# Patient Record
Sex: FEMALE | Race: WHITE | NOT HISPANIC OR LATINO | URBAN - METROPOLITAN AREA
[De-identification: names, ages, dates, MRNs, and addresses within clinical notes are randomized per-mention and may not be internally consistent; named-entity substitution may affect disease eponyms.]

---

## 2023-04-04 ENCOUNTER — EMERGENCY (EMERGENCY)
Facility: HOSPITAL | Age: 53
LOS: 1 days | Discharge: HOME | End: 2023-04-04
Attending: EMERGENCY MEDICINE | Admitting: EMERGENCY MEDICINE
Payer: MEDICAID

## 2023-04-04 VITALS
SYSTOLIC BLOOD PRESSURE: 137 MMHG | DIASTOLIC BLOOD PRESSURE: 96 MMHG | TEMPERATURE: 98 F | OXYGEN SATURATION: 97 % | RESPIRATION RATE: 18 BRPM | HEART RATE: 110 BPM

## 2023-04-04 VITALS
TEMPERATURE: 99 F | SYSTOLIC BLOOD PRESSURE: 161 MMHG | RESPIRATION RATE: 18 BRPM | OXYGEN SATURATION: 97 % | DIASTOLIC BLOOD PRESSURE: 73 MMHG | HEART RATE: 90 BPM

## 2023-04-04 PROCEDURE — 70486 CT MAXILLOFACIAL W/O DYE: CPT | Mod: MA

## 2023-04-04 PROCEDURE — 70450 CT HEAD/BRAIN W/O DYE: CPT | Mod: 26,MA

## 2023-04-04 PROCEDURE — 72125 CT NECK SPINE W/O DYE: CPT | Mod: 26,MA

## 2023-04-04 PROCEDURE — 99284 EMERGENCY DEPT VISIT MOD MDM: CPT | Mod: 25

## 2023-04-04 PROCEDURE — 70486 CT MAXILLOFACIAL W/O DYE: CPT | Mod: 26,MA

## 2023-04-04 PROCEDURE — 70450 CT HEAD/BRAIN W/O DYE: CPT | Mod: 26,MA,77

## 2023-04-04 PROCEDURE — 71101 X-RAY EXAM UNILAT RIBS/CHEST: CPT

## 2023-04-04 PROCEDURE — 99285 EMERGENCY DEPT VISIT HI MDM: CPT

## 2023-04-04 PROCEDURE — 72125 CT NECK SPINE W/O DYE: CPT | Mod: MA

## 2023-04-04 PROCEDURE — 70450 CT HEAD/BRAIN W/O DYE: CPT | Mod: MA

## 2023-04-04 PROCEDURE — 71101 X-RAY EXAM UNILAT RIBS/CHEST: CPT | Mod: 26,RT

## 2023-04-04 RX ORDER — LEVETIRACETAM 250 MG/1
500 TABLET, FILM COATED ORAL ONCE
Refills: 0 | Status: COMPLETED | OUTPATIENT
Start: 2023-04-04 | End: 2023-04-04

## 2023-04-04 RX ADMIN — LEVETIRACETAM 500 MILLIGRAM(S): 250 TABLET, FILM COATED ORAL at 10:21
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
## 2023-04-04 NOTE — ED PROVIDER NOTE - CARDIAC, MLM

## 2023-04-04 NOTE — ED ADULT NURSE NOTE - NSFALLRSKASSESASSIST_ED_ALL_ED
----- Message from Hanny Ventpatricia sent at 3/20/2017  1:55 PM CDT -----  Contact: pt's wife  Pt's wife called in reference to the procedure he is having on Friday am. She has some questions about it.  He is in the hospital here on the 7th floor room 730A.  She can be reached @ [phone] or [phone]. Thanks!!   no
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
## 2023-04-04 NOTE — ED PROVIDER NOTE - PATIENT PORTAL LINK FT
You can access the FollowMyHealth Patient Portal offered by [facility] by registering at the following website: [email or web address]. By joining [facility]’s FollowMyHealth portal, you will also be able to view your health information using other applications (apps) compatible with our system.

## 2023-04-04 NOTE — ED PROVIDER NOTE - NSFOLLOWUPCLINICS_GEN_ALL_ED_FT
[facility]  Family Medicine  [address]  [place], [place] [address]  Phone: [phone]  Fax:   Follow Up Time: 1-3 Days
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
## 2023-04-04 NOTE — ED PROVIDER NOTE - NSFOLLOWUPINSTRUCTIONS_ED_ALL_ED_FT
Head Injury, Adult    There are many types of head injuries. Head injuries can be as minor as a small bump, or they can be a serious medical issue. More severe head injuries include:  A jarring injury to the brain (concussion).  A bruise (contusion) of the brain. This means there is bleeding in the brain that can cause swelling.  A cracked skull (skull fracture).  Bleeding in the brain that collects, clots, and forms a bump (hematoma).  After a head injury, most problems occur within the first 24 hours, but side effects may occur up to 7–10 days after the injury. It is important to watch your condition for any changes. You may need to be observed in the emergency department or urgent care, or you may be admitted to the hospital.    What are the causes?  There are many possible causes of a head injury. Serious head injuries may be caused by car accidents, bicycle or motorcycle accidents, sports injuries, falls, or being struck by an object.    What are the symptoms?  Symptoms of a head injury include a contusion, bump, or bleeding at the site of the injury. Other physical symptoms may include:  Headache.  Nausea or vomiting.  Dizziness.  Blurred or double vision.  Being uncomfortable around bright lights or loud noises.  Seizures.  Feeling tired.  Trouble being awakened.  Loss of consciousness.  Mental or emotional symptoms may include:  Irritability.  Confusion and memory problems.  Poor attention and concentration.  Changes in eating or sleeping habits.  Anxiety or depression.  How is this diagnosed?  This condition can usually be diagnosed based on your symptoms, a description of the injury, and a physical exam. You may also have imaging tests done, such as a CT scan or an MRI.    How is this treated?  Treatment for this condition depends on the severity and type of injury you have. The main goal of treatment is to prevent complications and allow the brain time to heal.    Mild head injury    If you have a mild head injury, you may be sent home, and treatment may include:  Observation. A responsible adult should stay with you for 24 hours after your injury and check on you often.  Physical rest.  Brain rest.  Pain medicines.  Severe head injury    If you have a severe head injury, treatment may include:  Close observation. This includes hospitalization with the following care:  Frequent physical exams.  Frequent checks of how your brain and nervous system are working (neurological status).  Checking your blood pressure and oxygen levels.  Medicines to relieve pain, prevent seizures, and decrease brain swelling.  Airway protection and breathing support. This may include using a ventilator.  Treatments that monitor and manage swelling inside the brain.  Brain surgery. This may be needed to:  Remove a collection of blood or blood clots.  Stop the bleeding.  Remove a part of the skull to allow room for the brain to swell.  Follow these instructions at home:  Activity    Rest and avoid activities that are physically hard or tiring.  Make sure you get enough sleep.  Let your brain rest by limiting activities that require a lot of thought or attention, such as:  Watching TV.  Playing memory games and puzzles.  Job-related work or homework.  Working on the computer, using social media, and texting.  Avoid activities that could cause another head injury, such as playing sports, until your health care provider approves. Having another head injury, especially before the first one has healed, can be dangerous.  Ask your health care provider when it is safe for you to return to your regular activities, including work or school. Ask your health care provider for a step-by-step plan for gradually returning to activities.  Ask your health care provider when you can drive, ride a bicycle, or use heavy machinery. Your ability to react may be slower after a brain injury. Do not do these activities if you are dizzy.  Lifestyle      Do not drink alcohol until your health care provider approves. Do not use drugs. Alcohol and certain drugs may slow your recovery and can put you at risk of further injury.  If it is harder than usual to remember things, write them down.  If you are easily distracted, try to do one thing at a time.  Talk with family members or close friends when making important decisions.  Tell your friends, family, a trusted colleague, and  about your injury, symptoms, and restrictions. Have them watch for any new or worsening problems.  General instructions    Take over-the-counter and prescription medicines only as told by your health care provider.  Have someone stay with you for 24 hours after your head injury. This person should watch you for any changes in your symptoms and be ready to seek medical help.  Keep all follow-up visits as told by your health care provider. This is important.  How is this prevented?  Work on improving your balance and strength to avoid falls.  Wear a seat belt when you are in a moving vehicle.  Wear a helmet when riding a bicycle, skiing, or doing any other sport or activity that has a risk of injury.  If you drink alcohol:  Limit how much you use to:  0–1 drink a day for nonpregnant women.  0–2 drinks a day for men.  Be aware of how much alcohol is in your drink. In the [place] one drink equals one 12 oz bottle of beer (355 mL), one 5 oz glass of wine (148 mL), or one 1½ oz glass of hard liquor (44 mL).  Take safety measures in your home, such as:  Removing clutter and tripping hazards from floors and stairways.  Using grab bars in bathrooms and handrails by stairs.  Placing non-slip mats on floors and in bathtubs.  Improving lighting in dim areas.  Where to find more information  Centers for Disease Control and Prevention: www.cdc.gov  Get help right away if:  You have:  A severe headache that is not helped by medicine.  Trouble walking or weakness in your arms and legs.  Clear or bloody fluid coming from your nose or ears.  Changes in your vision.  A seizure.  Increased confusion or irritability.  Your symptoms get worse.  You are sleepier than normal and have trouble staying awake.  You lose your balance.  Your pupils change size.  Your speech is slurred.  Your dizziness gets worse.  You vomit.  These symptoms may represent a serious problem that is an emergency. Do not wait to see if the symptoms will go away. Get medical help right away. Call your local emergency services (911 in the [place] Do not drive yourself to the hospital.    Summary  Head injuries can be minor, or they can be a serious medical issue requiring immediate attention.  Treatment for this condition depends on the severity and type of injury you have.  Have someone stay with you for 24 hours after your injury and check on you often.  Ask your health care provider when it is safe for you to return to your regular activities, including work or school.  Head injury prevention includes wearing a seat belt in a motor vehicle, using a helmet on a bicycle, limiting alcohol use, and taking safety measures in your home.  This information is not intended to replace advice given to you by your health care provider. Make sure you discuss any questions you have with your health care provider.      Alcohol Withdrawal Syndrome  Alcohol withdrawal syndrome is a group of symptoms that can develop when a person who drinks heavily and regularly stops drinking or drinks less. Alcohol withdrawal syndrome can be mild or severe, and it may even be life-threatening.    Alcohol withdrawal syndrome usually affects people who have alcohol use disorder, which may also be called alcoholism. Alcohol use disorder is when a person is unable to control his or her alcohol use, and drinking too much or too often causes problems at home, at work, or in relationships.    What are the causes?  Drinking heavily and drinking on a regular basis cause changes in brain chemistry. Over time, the body becomes dependent on alcohol. When alcohol use stops, the chemistry system in the brain becomes unbalanced and causes the symptoms of alcohol withdrawal.    What increases the risk?  Alcohol withdrawal syndrome is more likely to occur in people who drink more than the recommended limit of alcohol (2 drinks a day for men or 1 drink a day for non-pregnant women). It is also more likely to affect heavy drinkers who have been using alcohol for long periods of time. The more a person drinks and the longer he or she drinks, the greater the risk of alcohol withdrawal syndrome.    Severe withdrawal is more likely to develop in someone who:  Had severe alcohol withdrawal in the past.  Had a seizure during a previous episode of alcohol withdrawal.  Is elderly.  Uses other drugs.  Has a long-term (chronic) medical problem, such as heart, lung, or liver disease.  Has depression.  Does not get enough nutrients from his or her diet (malnutrition).  What are the signs or symptoms?  Symptoms of this condition can be mild to moderate, or they can be severe. Symptoms may develop a few hours (or up to a day) after a person changes his or her drinking patterns. During the 48 hours after he or she has stopped drinking, the following symptoms may go away or get better:  Uncontrollable shaking (tremor).  Sweating.  Headache.  Anxiety.  Inability to relax (agitation).  Trouble sleeping (insomnia).  Irregular heartbeats (palpitations).  Alcohol cravings.  Seizure.  The following symptoms may get worse 24–48 hours after a person has decreased or stopped alcohol use, and they may gradually improve over a period of days or weeks:  Nausea and vomiting.  Fatigue.  Sensitivity to light and sounds.  Confusion and inability to think clearly.  Loss of appetite.  Mood swings, irritability, depression, and anxiety.  Insomnia and nightmares.  The following symptoms are severe and life-threatening. When these symptoms occur together, they are called delirium tremens (DTs):  High blood pressure.  Increased heart rate.  Trouble breathing.  Seizures. These may go away along with other symptoms, or they may persist.  Seeing, hearing, feeling, smelling, or tasting things that are not there (hallucinations). If you experience hallucinations, they usually begin 12–24 hours after a change in drinking patterns.  Delirium tremens requires immediate hospitalization.    How is this diagnosed?  This condition may be diagnosed based on:  Your symptoms and medical history.  Your history of alcohol use. Your health care provider may ask questions about your drinking behavior. It is important to be honest when you answer these questions.  A psychological assessment.  A physical exam.  Blood tests or urine tests to measure blood alcohol level and to rule out other causes of symptoms.  MRI or CT scan. This may be done if you seem to have abnormal thinking or behaviors (altered mental status).  Diagnosis can be difficult. People going through withdrawal often avoid seeking medical care and are not thinking clearly. Friends and family members play an important role in recognizing symptoms and encouraging loved ones to get treatment.    How is this treated?  Most people with symptoms of withdrawal can be treated outside of a hospital setting (outpatient treatment), with close monitoring such as daily check-ins with a health care provider and counseling. You may need treatment at a hospital or treatment center (inpatient treatment) if:  You have a history of delirium tremens or seizures.  You have severe symptoms.  You are addicted to other drugs.  You cannot swallow medicine.  You have a serious medical condition such as heart failure.  You experienced withdrawal in the past but then you continued drinking alcohol.  You are not likely to commit to an outpatient treatment schedule.  Treatment may involve:  Monitoring your blood pressure, pulse, and breathing.  IV fluids to keep you hydrated.  Medicines to reduce withdrawal symptoms and discomfort (benzodiazepines).  Medicine to reduce anxiety.  Medicine to prevent or control seizures.  Multivitamins and B vitamins.  Having a health care provider check on you daily.  It is important to get treatment for alcohol withdrawal early. Getting treatment early can:  Speed up your recovery from withdrawal symptoms.  Make you more successful with long-term stoppage of alcohol use (sobriety).  If you need help to stop drinking, your health care provider may recommend a long-term treatment plan that includes:  Medicines to help treat alcohol use disorder.  Substance abuse counseling.  Support groups.  Follow these instructions at home:    Take over-the-counter and prescription medicines (including vitamin supplements) only as told by your health care provider.  Do not drink alcohol.  Do not drive until your health care provider approves.  Have someone you trust stay with you or be available if you need help with your symptoms or with not drinking.  Drink enough fluid to keep your urine pale yellow.  Consider joining an alcohol support group or treatment program. These can provide emotional support, advice, and guidance.  Keep all follow-up visits as told by your health care provider. This is important.  Contact a health care provider if:  Your symptoms get worse instead of better.  You cannot eat or drink without vomiting.  You are struggling with not drinking alcohol.  You cannot stop drinking alcohol.  Get help right away if:  You have an irregular heartbeat.  You have chest pain.  You have trouble breathing.  You have a seizure for the first time.  You hallucinate.  You become very confused.  Summary  Alcohol withdrawal is a group of symptoms that can develop when a person who drinks heavily and regularly stops drinking or drinks less.  Symptoms of this condition can be mild to moderate, or they can be severe.  Treatment may include hospitalization, medicine, and counseling.  This information is not intended to replace advice given to you by your health care provider. Make sure you discuss any questions you have with your health care provider.
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
## 2023-04-04 NOTE — ED PROVIDER NOTE - ENMT, MLM
Extensive swelling and contusions to bilateral periorbital region with chemosis of bilateral subconjunctival hemorrhages.  Pupils are normal and reactive to light, 4 mm in size bilaterally and symmetric.  There is mild midline cervical spine tenderness.  There is contusions to forehead.

## 2023-04-06 DIAGNOSIS — I10 ESSENTIAL (PRIMARY) HYPERTENSION: ICD-10-CM
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
## 2023-04-06 DIAGNOSIS — S00.11XA CONTUSION OF RIGHT EYELID AND PERIOCULAR AREA, INITIAL ENCOUNTER: ICD-10-CM

## 2023-04-06 DIAGNOSIS — S00.83XA CONTUSION OF OTHER PART OF HEAD, INITIAL ENCOUNTER: ICD-10-CM

## 2023-04-06 DIAGNOSIS — M54.2 CERVICALGIA: ICD-10-CM

## 2023-04-06 DIAGNOSIS — S60.222A CONTUSION OF LEFT HAND, INITIAL ENCOUNTER: ICD-10-CM

## 2023-04-06 DIAGNOSIS — S80.12XA CONTUSION OF LEFT LOWER LEG, INITIAL ENCOUNTER: ICD-10-CM

## 2023-04-06 DIAGNOSIS — Z88.0 ALLERGY STATUS TO PENICILLIN: ICD-10-CM

## 2023-04-06 DIAGNOSIS — Z91.51 PERSONAL HISTORY OF SUICIDAL BEHAVIOR: ICD-10-CM

## 2023-04-06 DIAGNOSIS — R25.1 TREMOR, UNSPECIFIED: ICD-10-CM
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
## 2023-04-06 DIAGNOSIS — S00.12XA CONTUSION OF LEFT EYELID AND PERIOCULAR AREA, INITIAL ENCOUNTER: ICD-10-CM

## 2023-04-06 DIAGNOSIS — S60.221A CONTUSION OF RIGHT HAND, INITIAL ENCOUNTER: ICD-10-CM

## 2023-04-06 DIAGNOSIS — S80.11XA CONTUSION OF RIGHT LOWER LEG, INITIAL ENCOUNTER: ICD-10-CM

## 2023-04-06 DIAGNOSIS — R11.10 VOMITING, UNSPECIFIED: ICD-10-CM

## 2023-07-21 ENCOUNTER — EMERGENCY (EMERGENCY)
Facility: HOSPITAL | Age: 53
LOS: 1 days | Discharge: HOME | End: 2023-07-21
Attending: EMERGENCY MEDICINE
Payer: SELF-PAY

## 2023-07-21 VITALS
RESPIRATION RATE: 18 BRPM | OXYGEN SATURATION: 97 % | TEMPERATURE: 98 F | DIASTOLIC BLOOD PRESSURE: 81 MMHG | SYSTOLIC BLOOD PRESSURE: 126 MMHG | HEART RATE: 58 BPM

## 2023-07-21 VITALS — OXYGEN SATURATION: 98 % | WEIGHT: 164.91 LBS

## 2023-07-21 LAB
ALBUMIN SERPL ELPH-MCNC: 3.9 G/DL — SIGNIFICANT CHANGE UP (ref 3.3–5.2)
ALP SERPL-CCNC: 103 U/L — SIGNIFICANT CHANGE UP (ref 40–120)
ALT FLD-CCNC: 11 U/L — SIGNIFICANT CHANGE UP
ANION GAP SERPL CALC-SCNC: 17 MMOL/L — SIGNIFICANT CHANGE UP (ref 5–17)
APAP SERPL-MCNC: <3 UG/ML — LOW (ref 10–26)
AST SERPL-CCNC: 23 U/L — SIGNIFICANT CHANGE UP
BILIRUB SERPL-MCNC: <0.2 MG/DL — LOW (ref 0.4–2)
BUN SERPL-MCNC: 12 MG/DL — SIGNIFICANT CHANGE UP (ref 8–20)
CALCIUM SERPL-MCNC: 8.3 MG/DL — LOW (ref 8.4–10.5)
CHLORIDE SERPL-SCNC: 110 MMOL/L — HIGH (ref 96–108)
CO2 SERPL-SCNC: 19 MMOL/L — LOW (ref 22–29)
CREAT SERPL-MCNC: 0.84 MG/DL — SIGNIFICANT CHANGE UP (ref 0.5–1.3)
EGFR: 84 ML/MIN/1.73M2 — SIGNIFICANT CHANGE UP
ETHANOL SERPL-MCNC: 382 MG/DL — HIGH (ref 0–9)
GLUCOSE SERPL-MCNC: 93 MG/DL — SIGNIFICANT CHANGE UP (ref 70–99)
HCG SERPL-ACNC: <4 MIU/ML — SIGNIFICANT CHANGE UP
HCT VFR BLD CALC: 38.3 % — SIGNIFICANT CHANGE UP (ref 34.5–45)
HGB BLD-MCNC: 12.8 G/DL — SIGNIFICANT CHANGE UP (ref 11.5–15.5)
MCHC RBC-ENTMCNC: 31.1 PG — SIGNIFICANT CHANGE UP (ref 27–34)
MCHC RBC-ENTMCNC: 33.4 GM/DL — SIGNIFICANT CHANGE UP (ref 32–36)
MCV RBC AUTO: 93.2 FL — SIGNIFICANT CHANGE UP (ref 80–100)
PLATELET # BLD AUTO: 329 K/UL — SIGNIFICANT CHANGE UP (ref 150–400)
POTASSIUM SERPL-MCNC: 3.3 MMOL/L — LOW (ref 3.5–5.3)
POTASSIUM SERPL-SCNC: 3.3 MMOL/L — LOW (ref 3.5–5.3)
PROT SERPL-MCNC: 7.2 G/DL — SIGNIFICANT CHANGE UP (ref 6.6–8.7)
RBC # BLD: 4.11 M/UL — SIGNIFICANT CHANGE UP (ref 3.8–5.2)
RBC # FLD: 14.5 % — SIGNIFICANT CHANGE UP (ref 10.3–14.5)
SALICYLATES SERPL-MCNC: <0.6 MG/DL — LOW (ref 10–20)
SODIUM SERPL-SCNC: 146 MMOL/L — HIGH (ref 135–145)
WBC # BLD: 7.73 K/UL — SIGNIFICANT CHANGE UP (ref 3.8–10.5)
WBC # FLD AUTO: 7.73 K/UL — SIGNIFICANT CHANGE UP (ref 3.8–10.5)

## 2023-07-21 PROCEDURE — 99285 EMERGENCY DEPT VISIT HI MDM: CPT

## 2023-07-21 PROCEDURE — 93010 ELECTROCARDIOGRAM REPORT: CPT

## 2023-07-21 RX ORDER — HALOPERIDOL DECANOATE 100 MG/ML
5 INJECTION INTRAMUSCULAR ONCE
Refills: 0 | Status: DISCONTINUED | OUTPATIENT
Start: 2023-07-21 | End: 2023-07-21

## 2023-07-21 RX ORDER — MIDAZOLAM HYDROCHLORIDE 1 MG/ML
4 INJECTION, SOLUTION INTRAMUSCULAR; INTRAVENOUS ONCE
Refills: 0 | Status: DISCONTINUED | OUTPATIENT
Start: 2023-07-21 | End: 2023-07-21

## 2023-07-21 RX ORDER — HALOPERIDOL DECANOATE 100 MG/ML
5 INJECTION INTRAMUSCULAR ONCE
Refills: 0 | Status: COMPLETED | OUTPATIENT
Start: 2023-07-21 | End: 2023-07-21

## 2023-07-21 RX ORDER — MIDAZOLAM HYDROCHLORIDE 1 MG/ML
5 INJECTION, SOLUTION INTRAMUSCULAR; INTRAVENOUS ONCE
Refills: 0 | Status: DISCONTINUED | OUTPATIENT
Start: 2023-07-21 | End: 2023-07-21

## 2023-07-21 RX ADMIN — Medication 2 MILLIGRAM(S): at 11:57

## 2023-07-21 RX ADMIN — MIDAZOLAM HYDROCHLORIDE 4 MILLIGRAM(S): 1 INJECTION, SOLUTION INTRAMUSCULAR; INTRAVENOUS at 12:23

## 2023-07-21 RX ADMIN — HALOPERIDOL DECANOATE 5 MILLIGRAM(S): 100 INJECTION INTRAMUSCULAR at 11:58

## 2023-07-21 RX ADMIN — MIDAZOLAM HYDROCHLORIDE 5 MILLIGRAM(S): 1 INJECTION, SOLUTION INTRAMUSCULAR; INTRAVENOUS at 09:25
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
## 2023-07-21 NOTE — ED ADULT NURSE REASSESSMENT NOTE - NS ED NURSE REASSESS COMMENT FT1
Assumed care of pt at 19:15 as stated in report from ESTEBAN He. Charting as noted. Patient A&O x4, denies pain/discomfort, denies CP/SOB. Updated on the plan of care. Call bell within reach, bed locked in lowest position. IV site flushed w/ NS. No redness, swelling or pain noted to site. No signs of acute distress noted, safety maintained.

## 2023-07-21 NOTE — ED ADULT NURSE NOTE - NSFALLUNIVINTERV_ED_ALL_ED
Bed/Stretcher in lowest position, wheels locked, appropriate side rails in place/Call bell, personal items and telephone in reach/Instruct patient to call for assistance before getting out of bed/chair/stretcher/Non-slip footwear applied when patient is off stretcher/[place] to call system/Physically safe environment - no spills, clutter or unnecessary equipment/Purposeful proactive rounding/Room/bathroom lighting operational, light cord in reach Bed/Stretcher in lowest position, wheels locked, appropriate side rails in place/Call bell, personal items and telephone in reach/Instruct patient to call for assistance before getting out of bed/chair/stretcher/Non-slip footwear applied when patient is off stretcher/[place] to call system/Physically safe environment - no spills, clutter or unnecessary equipment/Purposeful proactive rounding/Room/bathroom lighting operational, light cord in reach Bed/Stretcher in lowest position, wheels locked, appropriate side rails in place/Call bell, personal items and telephone in reach/Instruct patient to call for assistance before getting out of bed/chair/stretcher/Non-slip footwear applied when patient is off stretcher/[place] to call system/Physically safe environment - no spills, clutter or unnecessary equipment/Purposeful proactive rounding/Room/bathroom lighting operational, light cord in reach

## 2023-07-21 NOTE — ED ADULT NURSE REASSESSMENT NOTE - NS ED NURSE REASSESS COMMENT FT1
Assumed care for pt at 1010. Pt is sleeping in stretcher. Pt is on continuous pulse oximetry reading 96% on room air. Bed is in lowest position and side rails are up. Assumed care for pt at 1010. Pt is sleeping in stretcher. Pt is on continuous pulse oximetry reading 96% on room air. Bed is in lowest position and side rails are up. Pt able to be aroused with stimuli. Assumed care for pt at 1010. Pt is sleeping in stretcher. Pt is on continuous pulse oximetry reading 90% when brought in. Pt put on 2L nasal cannula that is currently reading 97%. Bed is in lowest position and side rails are up. Pt able to be aroused with painful stimuli. Assumed care for pt at 1010. Pt is sleeping in stretcher. Pt is on continuous pulse oximetry reading 90% when brought in. Pt put on 2L nasal cannula that is currently reading 97%. Pt is also placed on cardiac monitor reading sinus bradycardia of 48 bpm. MD Lewis made aware and EKG has been ordered. Bed is in lowest position and side rails are up.

## 2023-07-21 NOTE — ED PROVIDER NOTE - NSFOLLOWUPINSTRUCTIONS_ED_ALL_ED_FT
Alcohol Abuse    Alcohol intoxication occurs when the amount of alcohol that a person has consumed impairs his or her ability to mentally and physically function. Chronic alcohol consumption can also lead to a variety of health issues including neurological disease, stomach disease, heart disease, liver disease, etc. Do not drive after drinking alcohol. Drinking enough alcohol to end up in an Emergency Room suggests you may have an alcohol abuse problem. Seek help at a drug addiction center.    1) Consider attending an AA meeting   2) If AA meetings are not working well, consider trying a SMART Recovery meeting. There are meetings in person and on zoom. The website is: [email or web address]   3) Consider seeing a primary care physician to discuss medications that can help decrease your craving for alcohol       SEEK IMMEDIATE MEDICAL CARE IF YOU HAVE ANY OF THE FOLLOWING SYMPTOMS: seizures, vomiting blood, blood in your stool, lightheadedness/dizziness, or becoming shaky to tremulous when you stop drinking. Alcohol Abuse    Alcohol intoxication occurs when the amount of alcohol that a person has consumed impairs his or her ability to mentally and physically function. Chronic alcohol consumption can also lead to a variety of health issues including neurological disease, stomach disease, heart disease, liver disease, etc. Do not drive after drinking alcohol. Drinking enough alcohol to end up in an Emergency Room suggests you may have an alcohol abuse problem. Seek help at a drug addiction center.    1) Consider attending an AA meeting   2) If AA meetings are not working well, consider trying a SMART Recovery meeting. There are meetings in person and on zoom. The website is: [email or web address]   3) Consider seeing a primary care physician to discuss medications that can help decrease your craving for alcohol       SEEK IMMEDIATE MEDICAL CARE IF YOU HAVE ANY OF THE FOLLOWING SYMPTOMS: seizures, vomiting blood, blood in your stool, lightheadedness/dizziness, or becoming shaky to tremulous when you stop drinking. Alcohol Abuse    Alcohol intoxication occurs when the amount of alcohol that a person has consumed impairs his or her ability to mentally and physically function. Chronic alcohol consumption can also lead to a variety of health issues including neurological disease, stomach disease, heart disease, liver disease, etc. Do not drive after drinking alcohol. Drinking enough alcohol to end up in an Emergency Room suggests you may have an alcohol abuse problem. Seek help at a drug addiction center.    1) Consider attending an AA meeting   2) If AA meetings are not working well, consider trying a SMART Recovery meeting. There are meetings in person and on zoom. The website is: [email or web address]   3) Consider seeing a primary care physician to discuss medications that can help decrease your craving for alcohol       SEEK IMMEDIATE MEDICAL CARE IF YOU HAVE ANY OF THE FOLLOWING SYMPTOMS: seizures, vomiting blood, blood in your stool, lightheadedness/dizziness, or becoming shaky to tremulous when you stop drinking.

## 2023-07-21 NOTE — ED ADULT TRIAGE NOTE - CHIEF COMPLAINT QUOTE
pt awake and alert, uncooperative and combative in triage, found in hotel parking lot intoxicated. pt refusing to provide additional information. MD Carrera called for eval.

## 2023-07-21 NOTE — ED ADULT TRIAGE NOTE - WEIGHT IN KG
1. C/w Klonopin at reduced dose of 0.5 mg q8h standing (no PRNs), Seroquel at same dose (50 mg BID)  2. Continue delirium precautions: Frequent reorientation, familiar pictures and objects at bedside, natural light in daytime, consistent sleep/wake schedule, adequate hydration and nutrition, sensory aids (hearing aids, glasses) present if needed, minimizing noise and overstimulation, clustering care to minimize overnight interruptions, judicious use of deliriogenic medications (anticholinergics, benzodiazepines and opioid analgesics), minimize use of restraints  --Highly recommend pt be provided with whiteboard and marker to facilitate communication with providers  3. Medical management as directed by primary team  4. Psychiatry will continue to follow PRN for delirium management  5. Case d/w ABIGAIL Khan of primary team    Paloma Cali MD  Director, Consultation-Liaison Psychiatry Service  [id number]     1. C/w Klonopin at reduced dose of 0.5 mg q12h standing (no PRNs), Seroquel at same dose (50 mg BID)  2. Continue delirium precautions: Frequent reorientation, familiar pictures and objects at bedside, natural light in daytime, consistent sleep/wake schedule, adequate hydration and nutrition, sensory aids (hearing aids, glasses) present if needed, minimizing noise and overstimulation, clustering care to minimize overnight interruptions, judicious use of deliriogenic medications (anticholinergics, benzodiazepines and opioid analgesics), minimize use of restraints  --Highly recommend pt be provided with whiteboard and marker to facilitate communication with providers  3. Medical management as directed by primary team  4. Pt is psych cleared for dispo as soon as he is medically optimized  5. Psychiatry is signing off. Reconsult if additional issues arise as inpatient  6. Case d/w ABIGAIL Rivero of primary team    Paloma Cali MD  Director, Consultation-Liaison Psychiatry Service  [id number]     74.8

## 2023-07-21 NOTE — ED PROVIDER NOTE - PATIENT PORTAL LINK FT
You can access the FollowMyHealth Patient Portal offered by [facility] by registering at the following website: [email or web address]. By joining [employer]’s FollowMyHealth portal, you will also be able to view your health information using other applications (apps) compatible with our system. You can access the FollowMyHealth Patient Portal offered by [facility] by registering at the following website: [email or web address]. By joining [employer]’s FollowMyHealth portal, you will also be able to view your health information using other applications (apps) compatible with our system. You can access the FollowMyHealth Patient Portal offered by [facility] by registering at the following website: [email or web address]. By joining [employer]’s FollowMyHealth portal, you will also be able to view your health information using other applications (apps) compatible with our system.

## 2023-07-21 NOTE — ED ADULT NURSE NOTE - OBJECTIVE STATEMENT
Pt is a 53 year old female coming in combative and intoxicated. Pt was given Versed in ambulance triage and is now resting in stretcher. Pt HR dropped to 48, MD Lewis made aware and EKG has been ordered. Pt is on  reading 90% and has been given 2L nasal cannula and O2 has improved to 96%. Plan of care is to await for pt to be aroused and find ride home.

## 2023-07-21 NOTE — ED PROVIDER NOTE - PROGRESS NOTE DETAILS
Joshua: Pt reassessed, clinically sober able to ambulate around the department. Requesting clothes and discharge papers. Cleared for d/c.

## 2023-07-21 NOTE — ED ADULT NURSE REASSESSMENT NOTE - NS ED NURSE REASSESS COMMENT FT1
Pt is sleeping in stretcher. Pt is on cardiac monitor reading 54 bpm in sinus bradycardia. Pt is on O2 reading 96% on room air. Pt is waiting to sober up before being sent home. Bed is in lowest position and side rails are up.
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
## 2023-07-21 NOTE — ED ADULT NURSE REASSESSMENT NOTE - NS ED NURSE REASSESS COMMENT FT1
Pt is resting in stretcher. Pt is on cardiac monitor reading sinus bradycardia at 50 bpm. Pt is on room air and O2 is at 96%. Bed is in lowest position, side rails are up.

## 2023-07-22 PROCEDURE — 36415 COLL VENOUS BLD VENIPUNCTURE: CPT

## 2023-07-22 PROCEDURE — 96372 THER/PROPH/DIAG INJ SC/IM: CPT

## 2023-07-22 PROCEDURE — 99284 EMERGENCY DEPT VISIT MOD MDM: CPT | Mod: 25

## 2023-07-22 PROCEDURE — 84702 CHORIONIC GONADOTROPIN TEST: CPT

## 2023-07-22 PROCEDURE — 93005 ELECTROCARDIOGRAM TRACING: CPT

## 2023-07-22 PROCEDURE — 80307 DRUG TEST PRSMV CHEM ANLYZR: CPT

## 2023-07-22 PROCEDURE — 80053 COMPREHEN METABOLIC PANEL: CPT

## 2023-07-22 PROCEDURE — 85027 COMPLETE CBC AUTOMATED: CPT

## 2025-03-21 NOTE — ED ADULT NURSE NOTE - CCCP TRG CHIEF CMPLNT
- Patient was following with Dr. Win Sosa with Endocrinology, but states she is stable; therefore, she has no future follow-ups scheduled.  - TSH negative.  - Continue Synthroid 88mcg daily.   ingestion